# Patient Record
Sex: FEMALE | Race: BLACK OR AFRICAN AMERICAN | NOT HISPANIC OR LATINO | Employment: FULL TIME | URBAN - METROPOLITAN AREA
[De-identification: names, ages, dates, MRNs, and addresses within clinical notes are randomized per-mention and may not be internally consistent; named-entity substitution may affect disease eponyms.]

---

## 2023-08-07 ENCOUNTER — HOSPITAL ENCOUNTER (EMERGENCY)
Facility: OTHER | Age: 43
Discharge: HOME | End: 2023-08-07
Attending: EMERGENCY MEDICINE
Payer: MEDICAID

## 2023-08-07 VITALS
RESPIRATION RATE: 15 BRPM | BODY MASS INDEX: 23.95 KG/M2 | TEMPERATURE: 98 F | WEIGHT: 149 LBS | HEART RATE: 69 BPM | HEIGHT: 66 IN | OXYGEN SATURATION: 100 % | DIASTOLIC BLOOD PRESSURE: 68 MMHG | SYSTOLIC BLOOD PRESSURE: 120 MMHG

## 2023-08-07 DIAGNOSIS — M62.838 MUSCLE SPASM OF RIGHT LEG: Primary | ICD-10-CM

## 2023-08-07 DIAGNOSIS — R20.2 PARESTHESIAS: ICD-10-CM

## 2023-08-07 LAB
ALBUMIN SERPL BCP-MCNC: 4.1 G/DL (ref 3.5–5.2)
ALP SERPL-CCNC: 59 U/L (ref 55–135)
ALT SERPL W/O P-5'-P-CCNC: 16 U/L (ref 10–44)
ANION GAP SERPL CALC-SCNC: 12 MMOL/L (ref 8–16)
AST SERPL-CCNC: 21 U/L (ref 10–40)
BASOPHILS # BLD AUTO: 0.03 K/UL (ref 0–0.2)
BASOPHILS NFR BLD: 0.4 % (ref 0–1.9)
BILIRUB SERPL-MCNC: 0.7 MG/DL (ref 0.1–1)
BUN SERPL-MCNC: 19 MG/DL (ref 6–20)
CALCIUM SERPL-MCNC: 9.7 MG/DL (ref 8.7–10.5)
CHLORIDE SERPL-SCNC: 103 MMOL/L (ref 95–110)
CK SERPL-CCNC: 190 U/L (ref 20–180)
CO2 SERPL-SCNC: 21 MMOL/L (ref 23–29)
CREAT SERPL-MCNC: 0.7 MG/DL (ref 0.5–1.4)
DIFFERENTIAL METHOD: ABNORMAL
EOSINOPHIL # BLD AUTO: 0.1 K/UL (ref 0–0.5)
EOSINOPHIL NFR BLD: 1.7 % (ref 0–8)
ERYTHROCYTE [DISTWIDTH] IN BLOOD BY AUTOMATED COUNT: 13.7 % (ref 11.5–14.5)
EST. GFR  (NO RACE VARIABLE): >60 ML/MIN/1.73 M^2
GLUCOSE SERPL-MCNC: 99 MG/DL (ref 70–110)
HCT VFR BLD AUTO: 35 % (ref 37–48.5)
HGB BLD-MCNC: 10.8 G/DL (ref 12–16)
IMM GRANULOCYTES # BLD AUTO: 0.02 K/UL (ref 0–0.04)
IMM GRANULOCYTES NFR BLD AUTO: 0.2 % (ref 0–0.5)
LYMPHOCYTES # BLD AUTO: 3 K/UL (ref 1–4.8)
LYMPHOCYTES NFR BLD: 37.3 % (ref 18–48)
MCH RBC QN AUTO: 23 PG (ref 27–31)
MCHC RBC AUTO-ENTMCNC: 30.9 G/DL (ref 32–36)
MCV RBC AUTO: 75 FL (ref 82–98)
MONOCYTES # BLD AUTO: 0.6 K/UL (ref 0.3–1)
MONOCYTES NFR BLD: 7.1 % (ref 4–15)
NEUTROPHILS # BLD AUTO: 4.3 K/UL (ref 1.8–7.7)
NEUTROPHILS NFR BLD: 53.3 % (ref 38–73)
NRBC BLD-RTO: 0 /100 WBC
PLATELET # BLD AUTO: 206 K/UL (ref 150–450)
PMV BLD AUTO: 10 FL (ref 9.2–12.9)
POTASSIUM SERPL-SCNC: 3.5 MMOL/L (ref 3.5–5.1)
PROT SERPL-MCNC: 7.4 G/DL (ref 6–8.4)
RBC # BLD AUTO: 4.69 M/UL (ref 4–5.4)
SODIUM SERPL-SCNC: 136 MMOL/L (ref 136–145)
TSH SERPL DL<=0.005 MIU/L-ACNC: 1.31 UIU/ML (ref 0.4–4)
WBC # BLD AUTO: 8.07 K/UL (ref 3.9–12.7)

## 2023-08-07 PROCEDURE — 93010 ELECTROCARDIOGRAM REPORT: CPT | Mod: ,,, | Performed by: INTERNAL MEDICINE

## 2023-08-07 PROCEDURE — 93005 ELECTROCARDIOGRAM TRACING: CPT

## 2023-08-07 PROCEDURE — 93010 EKG 12-LEAD: ICD-10-PCS | Mod: ,,, | Performed by: INTERNAL MEDICINE

## 2023-08-07 PROCEDURE — 96375 TX/PRO/DX INJ NEW DRUG ADDON: CPT

## 2023-08-07 PROCEDURE — 99285 EMERGENCY DEPT VISIT HI MDM: CPT | Mod: 25

## 2023-08-07 PROCEDURE — 84443 ASSAY THYROID STIM HORMONE: CPT | Performed by: EMERGENCY MEDICINE

## 2023-08-07 PROCEDURE — 82550 ASSAY OF CK (CPK): CPT | Performed by: EMERGENCY MEDICINE

## 2023-08-07 PROCEDURE — 85025 COMPLETE CBC W/AUTO DIFF WBC: CPT | Performed by: EMERGENCY MEDICINE

## 2023-08-07 PROCEDURE — 80053 COMPREHEN METABOLIC PANEL: CPT | Performed by: EMERGENCY MEDICINE

## 2023-08-07 PROCEDURE — 96374 THER/PROPH/DIAG INJ IV PUSH: CPT

## 2023-08-07 PROCEDURE — 63600175 PHARM REV CODE 636 W HCPCS: Performed by: EMERGENCY MEDICINE

## 2023-08-07 RX ORDER — NAPROXEN 500 MG/1
500 TABLET ORAL 2 TIMES DAILY PRN
Qty: 30 TABLET | Refills: 0 | Status: SHIPPED | OUTPATIENT
Start: 2023-08-07

## 2023-08-07 RX ORDER — KETOROLAC TROMETHAMINE 30 MG/ML
10 INJECTION, SOLUTION INTRAMUSCULAR; INTRAVENOUS
Status: COMPLETED | OUTPATIENT
Start: 2023-08-07 | End: 2023-08-07

## 2023-08-07 RX ORDER — DIAZEPAM 10 MG/2ML
2 INJECTION INTRAMUSCULAR
Status: COMPLETED | OUTPATIENT
Start: 2023-08-07 | End: 2023-08-07

## 2023-08-07 RX ADMIN — DIAZEPAM 2 MG: 5 INJECTION, SOLUTION INTRAMUSCULAR; INTRAVENOUS at 05:08

## 2023-08-07 RX ADMIN — KETOROLAC TROMETHAMINE 10 MG: 30 INJECTION, SOLUTION INTRAMUSCULAR; INTRAVENOUS at 05:08

## 2023-08-07 NOTE — ED PROVIDER NOTES
"SCRIBE #1 NOTE: I, Brett Perea, am scribing for, and in the presence of,  Lazara Fitzpatrick MD. I have scribed the following portions of the note - Other sections scribed: HPI, ROS, PE.         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 Took Ibuprofen with no relief. )      HPI:  Helen Hayes is a 42 y.o. female presenting with complaint of extremity weakness that began yesterday. She states that she is a  and had a long day yesterday working in her truck. Throughout the day, she began developing pain and tingling sensations in her right leg. She developed the same symptoms again today, though worse than the onset's pain and despite a treatment of 800mg Ibuprofen. She also became short of breath and developed tingling in her right arm. She describes her dyspnea as "something sitting on my chest". She denies any back pain, falls or trauma. The patient has been seen in [facility] in 2021 for a stroke work up, though her imaging came back negative.    This is the extent to the patients complaints today here in the emergency department.    ROS: As per HPI and below:  Review of Systems   Constitutional: No fever.   HENT: No sore throat.    Eyes: No visual disturbance.   Respiratory: No cough. +shortness of breath.    Cardiovascular: No chest pain.   Gastrointestinal: No abdominal pain.  No nausea.  No vomiting.  Genitourinary: No flank pain.  No abnormal urination.  Musculoskeletal: No back pain. +myalgia   Skin: No rash   Neurological: No weakness.  No headache. +tingling    Review of patient's allergies indicates:  No Known Allergies    PMH:  As per HPI and below:  History reviewed. No pertinent past medical history.  History reviewed. No pertinent surgical history.    Social History     Tobacco Use    Smoking status: Never    Smokeless tobacco: Never " "  Substance Use Topics    Alcohol use: Yes     Comment: 1 glass of wine at night    Drug use: Never       Physical Exam:    /79   Pulse 65   Temp 98 °F (36.7 °C) (Oral)   Resp 12   Ht 5' 6" (1.676 m)   Wt 67.6 kg (149 lb)   SpO2 100%   Breastfeeding No   BMI 24.05 kg/m²   Nursing note and vital signs reviewed.    Appearance: Hyperventilating. Tearful. Anxious appearing.  Eyes: No conjunctival injection.  Neck: No deformity.   ENT: Oropharynx clear.  No stridor.   Chest/ Respiratory: Clear to auscultation bilaterally.  Good air movement.  No wheezes.  No rhonchi. No rales. No accessory muscle use.  Cardiovascular: Regular rate and rhythm.  No murmurs. No gallops. No rubs.  Abdomen: Soft.  Not distended.  Nontender.  No guarding.  No rebound. Non-peritoneal.  Musculoskeletal: Good range of motion all joints.  No deformities.  Neck supple.  No meningismus. ROM of right leg limited by pain. Holding leg in a rigid extended position. No focal weakness.  Skin: No rashes seen.  Good turgor.  No abrasions.  No ecchymoses.  Neurologic: Reports subjective paresthesias of right face. Motor intact.  Sensation intact.  Cerebellar intact.  Cranial nerves intact.  Mental Status:  Alert and oriented x 3.  Appropriate, conversant.       I decided to obtain the patient's medical records.  Summary of Medical Records:  Previous hospitalization [facility] in November 2011.  Patient had CTA, CT and MRI which were negative for stroke    Imaging:  CT Head Without Contrast   Final Result      1. No acute intracranial abnormalities allowing for motion artifact.   2. Sinus disease as above.         Electronically signed by: Tacos Carlson MD   Date:    08/07/2023   Time:    16:15            Labs:  Results for orders placed or performed during the hospital encounter of 08/07/23   CBC W/ AUTO DIFFERENTIAL   Result Value Ref Range    WBC 8.07 3.90 - 12.70 K/uL    RBC 4.69 4.00 - 5.40 M/uL    Hemoglobin 10.8 (L) 12.0 - 16.0 g/dL    " Hematocrit 35.0 (L) 37.0 - 48.5 %    MCV 75 (L) 82 - 98 fL    MCH 23.0 (L) 27.0 - 31.0 pg    MCHC 30.9 (L) 32.0 - 36.0 g/dL    RDW 13.7 11.5 - 14.5 %    Platelets 206 150 - 450 K/uL    MPV 10.0 9.2 - 12.9 fL    Immature Granulocytes 0.2 0.0 - 0.5 %    Gran # (ANC) 4.3 1.8 - 7.7 K/uL    Immature Grans (Abs) 0.02 0.00 - 0.04 K/uL    Lymph # 3.0 1.0 - 4.8 K/uL    Mono # 0.6 0.3 - 1.0 K/uL    Eos # 0.1 0.0 - 0.5 K/uL    Baso # 0.03 0.00 - 0.20 K/uL    nRBC 0 0 /100 WBC    Gran % 53.3 38.0 - 73.0 %    Lymph % 37.3 18.0 - 48.0 %    Mono % 7.1 4.0 - 15.0 %    Eosinophil % 1.7 0.0 - 8.0 %    Basophil % 0.4 0.0 - 1.9 %    Differential Method Automated    Comprehensive metabolic panel   Result Value Ref Range    Sodium 136 136 - 145 mmol/L    Potassium 3.5 3.5 - 5.1 mmol/L    Chloride 103 95 - 110 mmol/L    CO2 21 (L) 23 - 29 mmol/L    Glucose 99 70 - 110 mg/dL    BUN 19 6 - 20 mg/dL    Creatinine 0.7 0.5 - 1.4 mg/dL    Calcium 9.7 8.7 - 10.5 mg/dL    Total Protein 7.4 6.0 - 8.4 g/dL    Albumin 4.1 3.5 - 5.2 g/dL    Total Bilirubin 0.7 0.1 - 1.0 mg/dL    Alkaline Phosphatase 59 55 - 135 U/L    AST 21 10 - 40 U/L    ALT 16 10 - 44 U/L    eGFR >60 >60 mL/min/1.73 m^2    Anion Gap 12 8 - 16 mmol/L   TSH   Result Value Ref Range    TSH 1.307 0.400 - 4.000 uIU/mL   CK   Result Value Ref Range     (H) 20 - 180 U/L         Initial Impression  42 y.o. female with right leg pain and paresthesias following excessive activity at work yesterday.     Differential Dx:  Electrolyte abnormality, hypoglycemia, infectious causes, endocrine abnormalities, medication side effect, dehydration, anemia    Considered but not suspected at this time:  CVA, spinal cord abnormality, Guillain [place], neuromuscular junction disease, muscle disease     MDM:    Plan CT brain given endorsed weakness and right-sided paraesthesias, However, very low suspicion for stroke. Will also evaluate for electrolyte abnormality, cardiac or pulmonary causes, or  acute renal failure.        Patient improved with treatment in the emergency department and comfortable going home. Discussed reasons to return and importance of followup.  Patient understands that the emergency visit today is primarily to address immediate concerns and to rule out emergent cause of symptoms and that they may require further workup and evaluation as an outpatient. All questions addressed and patient given discharge instructions and followup information.              Scribe Attestation:   Scribe #1: I performed the above scribed service and the documentation accurately describes the services I performed. I attest to the accuracy of the note.    Physician Attestation for Scribe: I, Lazara Fitzpatrick MD, reviewed documentation as scribed in my presence, which is both accurate and complete.     Diagnostic Impression:    1. Muscle spasm of right leg    2. Paresthesias         ED Disposition Condition    Discharge Stable            ED Prescriptions    None       Follow-up Information    None           Lazara Fitzpatrick MD  08/07/23 [id number]

## 2023-08-07 NOTE — ED TRIAGE NOTES
41 yo female reports to ed with co R sided leg numbness and R sided arm tingling onset yesterday at work. Pt delivers mail and works outside. States symptoms have worsened today. Pt anxious on arrival. States it's been difficult to take a full breath in due to heavy sensation in chest. Aaox4.

## 2023-08-07 NOTE — DISCHARGE INSTRUCTIONS
Drink lots of fluids, return to the hospital for new or worsening symptoms or pain.  Take the medication as prescribed.